# Patient Record
Sex: MALE | Race: WHITE | HISPANIC OR LATINO
[De-identification: names, ages, dates, MRNs, and addresses within clinical notes are randomized per-mention and may not be internally consistent; named-entity substitution may affect disease eponyms.]

---

## 2022-03-24 PROBLEM — Z00.00 ENCOUNTER FOR PREVENTIVE HEALTH EXAMINATION: Status: ACTIVE | Noted: 2022-03-24

## 2022-03-25 ENCOUNTER — APPOINTMENT (OUTPATIENT)
Dept: PEDIATRIC ORTHOPEDIC SURGERY | Facility: CLINIC | Age: 27
End: 2022-03-25
Payer: COMMERCIAL

## 2022-03-25 VITALS — HEIGHT: 65 IN | BODY MASS INDEX: 19.99 KG/M2 | WEIGHT: 120 LBS

## 2022-03-25 PROCEDURE — 73552 X-RAY EXAM OF FEMUR 2/>: CPT

## 2022-03-25 PROCEDURE — 99212 OFFICE O/P EST SF 10 MIN: CPT

## 2022-03-25 NOTE — HISTORY OF PRESENT ILLNESS
[de-identified] : This 26-year-old male returns for reevaluation of a fibrous lesion of the right femur at the junction of the proximal and middle thirds.  This patient has no symptomatology at this time referable to the lesions.  The patient has no limp and no pain.

## 2022-03-25 NOTE — ASSESSMENT
[FreeTextEntry1] : Fibrous lesions right femur\par \par Because the patient is asymptomatic and these lesions on not causing any functional problem patient has been discharged.\par \par Encounter time: 15 minutes

## 2022-03-25 NOTE — PHYSICAL EXAM
[de-identified] : On physical examination the gait is normal.  There is a full range of motion of the right hip knee ankle and subtalar joints.  There is no tenderness along the shaft of the femur. [de-identified] : X-ray evaluation of the right femur on 3/25/2022 (AP and lateral views) reveals cystic lesions at the junction of the proximal and middle thirds of the femur without lysis of the cortical margins.\par Indication for x-ray of right femur: To determine progression of the fibrous lesions of the right femur

## 2023-09-19 ENCOUNTER — APPOINTMENT (OUTPATIENT)
Dept: PEDIATRIC ORTHOPEDIC SURGERY | Facility: CLINIC | Age: 28
End: 2023-09-19
Payer: COMMERCIAL

## 2023-09-19 VITALS — HEIGHT: 65 IN | TEMPERATURE: 97.5 F | BODY MASS INDEX: 19.99 KG/M2 | WEIGHT: 120 LBS

## 2023-09-19 DIAGNOSIS — M85.651 OTHER CYST OF BONE, RIGHT THIGH: ICD-10-CM

## 2023-09-19 DIAGNOSIS — M79.661 PAIN IN RIGHT LOWER LEG: ICD-10-CM

## 2023-09-19 PROCEDURE — 99213 OFFICE O/P EST LOW 20 MIN: CPT

## 2023-09-19 PROCEDURE — 73590 X-RAY EXAM OF LOWER LEG: CPT
